# Patient Record
Sex: FEMALE | Race: WHITE | URBAN - METROPOLITAN AREA
[De-identification: names, ages, dates, MRNs, and addresses within clinical notes are randomized per-mention and may not be internally consistent; named-entity substitution may affect disease eponyms.]

---

## 2018-01-16 ENCOUNTER — OFFICE VISIT (OUTPATIENT)
Dept: FAMILY MEDICINE | Facility: CLINIC | Age: 78
End: 2018-01-16
Payer: MEDICARE

## 2018-01-16 VITALS
HEIGHT: 67 IN | BODY MASS INDEX: 28.56 KG/M2 | TEMPERATURE: 98.4 F | WEIGHT: 182 LBS | HEART RATE: 78 BPM | SYSTOLIC BLOOD PRESSURE: 142 MMHG | DIASTOLIC BLOOD PRESSURE: 76 MMHG

## 2018-01-16 DIAGNOSIS — L24.9 IRRITANT CONTACT DERMATITIS, UNSPECIFIED TRIGGER: Primary | ICD-10-CM

## 2018-01-16 PROBLEM — I10 BENIGN ESSENTIAL HYPERTENSION: Status: ACTIVE | Noted: 2018-01-16

## 2018-01-16 PROBLEM — E11.9 TYPE 2 DIABETES MELLITUS WITHOUT COMPLICATION, WITHOUT LONG-TERM CURRENT USE OF INSULIN (H): Status: ACTIVE | Noted: 2018-01-16

## 2018-01-16 PROBLEM — E03.8 OTHER SPECIFIED HYPOTHYROIDISM: Status: ACTIVE | Noted: 2018-01-16

## 2018-01-16 PROCEDURE — 99203 OFFICE O/P NEW LOW 30 MIN: CPT | Performed by: PHYSICIAN ASSISTANT

## 2018-01-16 RX ORDER — TRIAMCINOLONE ACETONIDE 5 MG/G
CREAM TOPICAL
Qty: 30 G | Refills: 0 | Status: SHIPPED | OUTPATIENT
Start: 2018-01-16 | End: 2018-01-19

## 2018-01-16 RX ORDER — LEVOTHYROXINE SODIUM 50 MCG
TABLET ORAL
COMMUNITY
Start: 2018-01-04 | End: 2018-08-14

## 2018-01-16 RX ORDER — SIMVASTATIN 20 MG
TABLET ORAL
COMMUNITY
Start: 2017-01-26

## 2018-01-16 RX ORDER — LANCETS 33 GAUGE
EACH MISCELLANEOUS
COMMUNITY
Start: 2017-10-22

## 2018-01-16 RX ORDER — BLOOD-GLUCOSE METER
EACH MISCELLANEOUS
COMMUNITY
Start: 2017-06-15

## 2018-01-16 RX ORDER — GLIMEPIRIDE 4 MG/1
TABLET ORAL
COMMUNITY
Start: 2017-10-18 | End: 2018-08-14

## 2018-01-16 RX ORDER — LOSARTAN POTASSIUM 50 MG/1
TABLET ORAL
COMMUNITY
Start: 2017-12-29 | End: 2018-08-14

## 2018-01-16 NOTE — PATIENT INSTRUCTIONS
Start applying the cream to the rash two times daily    Try benadryl at bedtime for itching    IF this doesn't help, call me in the next few days and I will send in some other medication
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
## 2018-01-16 NOTE — NURSING NOTE
"Chief Complaint   Patient presents with     Derm Problem       Initial /76  Pulse 78  Temp 98.4  F (36.9  C) (Tympanic)  Ht 5' 6.75\" (1.695 m)  Wt 182 lb (82.6 kg)  BMI 28.72 kg/m2 Estimated body mass index is 28.72 kg/(m^2) as calculated from the following:    Height as of this encounter: 5' 6.75\" (1.695 m).    Weight as of this encounter: 182 lb (82.6 kg).  Medication Reconciliation: complete     Zachary Clement CMA    "

## 2018-01-16 NOTE — MR AVS SNAPSHOT
"              After Visit Summary   1/16/2018    Gracie Jackson    MRN: [id number]           Patient Information     Date Of Birth          1940        Visit Information        Provider Department      1/16/2018 10:40 AM Theodora Johnson PA-C [facility]        Today's Diagnoses     Irritant contact dermatitis, unspecified trigger    -  1      Care Instructions    Start applying the cream to the rash two times daily    Try benadryl at bedtime for itching    IF this doesn't help, call me in the next few days and I will send in some other medication          Follow-ups after your visit        Who to contact     Normal or non-critical lab and imaging results will be communicated to you by [employer], letter or phone within 4 business days after the clinic has received the results. If you do not hear from us within 7 days, please contact the clinic through [employer] or phone. If you have a critical or abnormal lab result, we will notify you by phone as soon as possible.  Submit refill requests through [employer] or call your pharmacy and they will forward the refill request to us. Please allow 3 business days for your refill to be completed.          If you need to speak with a  for additional information , please call: [phone]             Additional Information About Your Visit        My[employer] Information     [employer]ODO lets you send messages to your doctor, view your test results, renew your prescriptions, schedule appointments and more. To sign up, go to [email or web address] . Click on \"Log in\" on the left side of the screen, which will take you to the Welcome page. Then click on \"Sign up Now\" on the right side of the page.     You will be asked to enter the access code listed below, as well as some personal information. Please follow the directions to create your username and password.     Your access code is: [email or web address]  Expires: 4/16/2018 11:19 AM     Your access code " "will  in 90 days. If you need help or a new code, please call your [place] clinic or [phone].        Care EveryWhere ID     This is your Care EveryWhere ID. This could be used by other organizations to access your [place] medical records  [id number]        Your Vitals Were     Pulse Temperature Height BMI (Body Mass Index)          78 98.4  F (36.9  C) (Tympanic) 5' 6.75\" (1.695 m) 28.72 kg/m2         Blood Pressure from Last 3 Encounters:   18 142/76    Weight from Last 3 Encounters:   18 182 lb (82.6 kg)              Today, you had the following     No orders found for display         Today's Medication Changes          These changes are accurate as of: 18 11:19 AM.  If you have any questions, ask your nurse or doctor.               Start taking these medicines.        Dose/Directions    triamcinolone 0.5 % cream   Commonly known as:  KENALOG   Used for:  Irritant contact dermatitis, unspecified trigger   Started by:  Theodora Johnson PA-C        Apply sparingly to affected area two times daily.   Quantity:  30 g   Refills:  0            Where to get your medicines      These medications were sent to [facility] [place] - [address] [facility]  [address] [place] [address]     Phone:  [phone]     triamcinolone 0.5 % cream                Primary Care Provider Fax #    Josue Yu [phone]       [place] [address]        Equal Access to Services     [facility] AH: Hadii aad ku hadasho Soomaali, waaxda luqadaha, qaybta kaalmada adeegyada, waxay hugo monte . So [facility] [phone].    ATENCIÓN: Si habla dylan, tiene a nicholson disposición servicios gratuitos de asistencia lingüística. Llame al [phone].    We comply with applicable federal civil rights laws and [place] laws. We do not discriminate on the basis of race, color, national origin, age, disability, sex, sexual orientation, or gender " identity.            Thank you!     Thank you for choosing [facility]  for your care. Our goal is always to provide you with excellent care. Hearing back from our patients is one way we can continue to improve our services. Please take a few minutes to complete the written survey that you may receive in the mail after your visit with us. Thank you!             Your Updated Medication List - Protect others around you: Learn how to safely use, store and throw away your medicines at www.disposemymeds.org.          This list is accurate as of: 1/16/18 11:19 AM.  Always use your most recent med list.                   Brand Name Dispense Instructions for use Diagnosis    blood glucose monitoring meter device kit           glimepiride 4 MG tablet    AMARYL          losartan 50 MG tablet    COZAAR          * metFORMIN 1000 MG tablet    GLUCOPHAGE          * metFORMIN 500 MG tablet    GLUCOPHAGE          ONETOUCH DELICA LANCETS 33G Misc           ONETOUCH ULTRA test strip   Generic drug:  blood glucose monitoring           simvastatin 20 MG tablet    ZOCOR          SYNTHROID 50 MCG tablet   Generic drug:  levothyroxine           triamcinolone 0.5 % cream    KENALOG    30 g    Apply sparingly to affected area two times daily.    Irritant contact dermatitis, unspecified trigger       * Notice:  This list has 2 medication(s) that are the same as other medications prescribed for you. Read the directions carefully, and ask your doctor or other care provider to review them with you.

## 2018-01-19 ENCOUNTER — TELEPHONE (OUTPATIENT)
Dept: FAMILY MEDICINE | Facility: CLINIC | Age: 78
End: 2018-01-19

## 2018-01-19 DIAGNOSIS — L24.9 IRRITANT CONTACT DERMATITIS, UNSPECIFIED TRIGGER: ICD-10-CM

## 2018-01-19 RX ORDER — TRIAMCINOLONE ACETONIDE 5 MG/G
CREAM TOPICAL
Qty: 30 G | Refills: 0 | Status: SHIPPED | OUTPATIENT
Start: 2018-01-19

## 2018-01-19 NOTE — TELEPHONE ENCOUNTER
Patient was seen on 1/16/18 and was prescribed triamcinolone cream for a rash on her hands and arms. She reports that the rash is getting better, but it is still there along with the itch. She does not want to run out of the cream over the weekend. She is asking for a refill and if you think an oral antibiotic would be better, she is open to that as well. Refill is pended. Please advise. Thank you.  Marcella Mary RN

## 2018-01-19 NOTE — TELEPHONE ENCOUNTER
Reason for call:  Patient reporting a symptom    Symptom or request: Gracie is reporting that rash is improving but she is running out of the cream.  It was mentioned at appointment on 1/16/18 that maybe if cream did not work that Theodora could prescribe an oral medication.  Gracie doesn't know if that would be better or just getting another tube of the cream.  Please call and assess. Thank you..Cate Moe    Duration (how long have symptoms been present): Since 1/16/18    Have you been treated for this before? Yes with triamcinolone cream    Additional comments: none    Phone Number patient can be reached at:  Home number on file [phone] (home)    Best Time:  Any time    Can we leave a detailed message on this number:  YES    Call taken on 1/19/2018 at 10:41 AM by Cate Moe

## 2018-01-19 NOTE — TELEPHONE ENCOUNTER
I sent in the refill - did she really go through the tube she had filled on 1/16? Make sure she is not using it more than two times/day.     An antibiotic will not change anything but we had discussed a short burst of prednisone which could help. She could try the steroid ointment through the weekend and if still present on Monday call back and I can order the prednisone    Theodora

## 2018-06-12 ENCOUNTER — TELEPHONE (OUTPATIENT)
Dept: FAMILY MEDICINE | Facility: CLINIC | Age: 78
End: 2018-06-12

## 2018-06-12 NOTE — TELEPHONE ENCOUNTER
Gracie calling clinic because she was walking across her living room and stepped on a sewing needle she was using to repair some upholstery.She said it wasn't all the way in her foot but hurt non the less. Her doctor is in [place] but she is up here for the summer trying to sell her home.     She washed her foot well with hydrogen peroxide. She is wondering if she should be seen because she is diabetic.    Recommended soaking foot and applying bacitracin to puncture site. Should have a tetanus shot every ten years. If not up to date would get that however it is unlikely she would get tetanus from a sewing needle.But she could update this by coming to one of our pharmacies.    Watch for redness, fever or drainage. FYI to former provider. Janelle Acuña RN

## 2018-08-14 ENCOUNTER — OFFICE VISIT (OUTPATIENT)
Dept: FAMILY MEDICINE | Facility: CLINIC | Age: 78
End: 2018-08-14
Payer: MEDICARE

## 2018-08-14 VITALS
HEART RATE: 74 BPM | TEMPERATURE: 98 F | SYSTOLIC BLOOD PRESSURE: 144 MMHG | DIASTOLIC BLOOD PRESSURE: 76 MMHG | BODY MASS INDEX: 28.88 KG/M2 | HEIGHT: 67 IN | WEIGHT: 184 LBS

## 2018-08-14 DIAGNOSIS — I10 BENIGN ESSENTIAL HYPERTENSION: ICD-10-CM

## 2018-08-14 DIAGNOSIS — E11.9 TYPE 2 DIABETES MELLITUS WITHOUT COMPLICATION, WITHOUT LONG-TERM CURRENT USE OF INSULIN (H): ICD-10-CM

## 2018-08-14 DIAGNOSIS — Z00.00 ENCOUNTER FOR ROUTINE ADULT HEALTH EXAMINATION WITHOUT ABNORMAL FINDINGS: Primary | ICD-10-CM

## 2018-08-14 DIAGNOSIS — E03.9 HYPOTHYROIDISM, UNSPECIFIED TYPE: ICD-10-CM

## 2018-08-14 LAB
ANION GAP SERPL CALCULATED.3IONS-SCNC: 6 MMOL/L (ref 3–14)
BUN SERPL-MCNC: 17 MG/DL (ref 7–30)
CALCIUM SERPL-MCNC: 9.2 MG/DL (ref 8.5–10.1)
CHLORIDE SERPL-SCNC: 105 MMOL/L (ref 94–109)
CO2 SERPL-SCNC: 29 MMOL/L (ref 20–32)
CREAT SERPL-MCNC: 0.76 MG/DL (ref 0.52–1.04)
GFR SERPL CREATININE-BSD FRML MDRD: 73 ML/MIN/1.7M2
GLUCOSE SERPL-MCNC: 104 MG/DL (ref 70–99)
HBA1C MFR BLD: 6.4 % (ref 0–5.6)
POTASSIUM SERPL-SCNC: 3.8 MMOL/L (ref 3.4–5.3)
SODIUM SERPL-SCNC: 140 MMOL/L (ref 133–144)
TSH SERPL DL<=0.005 MIU/L-ACNC: 3.54 MU/L (ref 0.4–4)

## 2018-08-14 PROCEDURE — 99213 OFFICE O/P EST LOW 20 MIN: CPT | Mod: 25 | Performed by: PHYSICIAN ASSISTANT

## 2018-08-14 PROCEDURE — 80048 BASIC METABOLIC PNL TOTAL CA: CPT | Performed by: PHYSICIAN ASSISTANT

## 2018-08-14 PROCEDURE — G0439 PPPS, SUBSEQ VISIT: HCPCS | Performed by: PHYSICIAN ASSISTANT

## 2018-08-14 PROCEDURE — 84443 ASSAY THYROID STIM HORMONE: CPT | Performed by: PHYSICIAN ASSISTANT

## 2018-08-14 PROCEDURE — 83036 HEMOGLOBIN GLYCOSYLATED A1C: CPT | Performed by: PHYSICIAN ASSISTANT

## 2018-08-14 PROCEDURE — 36415 COLL VENOUS BLD VENIPUNCTURE: CPT | Performed by: PHYSICIAN ASSISTANT

## 2018-08-14 RX ORDER — ANTIARTHRITIC COMBINATION NO.2 900 MG
TABLET ORAL
COMMUNITY

## 2018-08-14 RX ORDER — LOSARTAN POTASSIUM 50 MG/1
50 TABLET ORAL DAILY
Qty: 90 TABLET | Refills: 1 | Status: SHIPPED | OUTPATIENT
Start: 2018-08-14 | End: 2018-12-28

## 2018-08-14 RX ORDER — GLIMEPIRIDE 4 MG/1
4 TABLET ORAL
Qty: 90 TABLET | Refills: 1 | Status: SHIPPED | OUTPATIENT
Start: 2018-08-14 | End: 2018-12-28

## 2018-08-14 RX ORDER — LEVOTHYROXINE SODIUM 50 MCG
50 TABLET ORAL DAILY
Qty: 90 TABLET | Refills: 1 | COMMUNITY
Start: 2018-08-14 | End: 2018-12-28
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
## 2018-08-14 NOTE — LETTER
August 17, 2018      Gracie Jackson  [address]  [place] [address]        Dear Gracie,    We are writing to inform you of your test results.    Gracie,   Your labs all look okay   Your electrolytes and kidney function are normal   Thyroid level is within the normal range   Your A1c is 6.4. While I don't know how this compares to your previous levels (I will when I get your records from [place]), this is a good level to be at   Theodora     Resulted Orders   TSH with free T4 reflex   Result Value Ref Range    TSH 3.54 0.40 - 4.00 mU/L   Hemoglobin A1c   Result Value Ref Range    Hemoglobin A1C 6.4 (H) 0 - 5.6 %      Comment:      Normal <5.7% Prediabetes 5.7-6.4%  Diabetes 6.5% or higher - adopted from ADA   consensus guidelines.     Basic metabolic panel  (Ca, Cl, CO2, Creat, Gluc, K, Na, BUN)   Result Value Ref Range    Sodium 140 133 - 144 mmol/L    Potassium 3.8 3.4 - 5.3 mmol/L    Chloride 105 94 - 109 mmol/L    Carbon Dioxide 29 20 - 32 mmol/L    Anion Gap 6 3 - 14 mmol/L    Glucose 104 (H) 70 - 99 mg/dL    Urea Nitrogen 17 7 - 30 mg/dL    Creatinine 0.76 0.52 - 1.04 mg/dL    GFR Estimate 73 >60 mL/min/1.7m2      Comment:      Non  GFR Calc    GFR Estimate If Black 88 >60 mL/min/1.7m2      Comment:       GFR Calc    Calcium 9.2 8.5 - 10.1 mg/dL       If you have any questions or concerns, please call the clinic at the number listed above.       Sincerely,        Theodora Johnson PA-C

## 2018-08-14 NOTE — MR AVS SNAPSHOT
After Visit Summary   8/14/2018    Gracie Jackson    MRN: [id number]           Patient Information     Date Of Birth          1940        Visit Information        Provider Department      8/14/2018 1:20 PM Theodora Johnson PA-C [facility]        Today's Diagnoses     Type 2 diabetes mellitus without complication, without long-term current use of insulin (H)    -  1    Benign essential hypertension        Hypothyroidism, unspecified type          Care Instructions      Preventive Health Recommendations    Female Ages 65 +    Yearly exam:     See your health care provider every year in order to  o Review health changes.   o Discuss preventive care.    o Review your medicines if your doctor has prescribed any.      You no longer need a yearly Pap test unless you've had an abnormal Pap test in the past 10 years. If you have vaginal symptoms, such as bleeding or discharge, be sure to talk with your provider about a Pap test.      Every 1 to 2 years, have a mammogram.  If you are over 69, talk with your health care provider about whether or not you want to continue having screening mammograms.      Every 10 years, have a colonoscopy. Or, have a yearly FIT test (stool test). These exams will check for colon cancer.       Have a cholesterol test every 5 years, or more often if your doctor advises it.       Have a diabetes test (fasting glucose) every three years. If you are at risk for diabetes, you should have this test more often.       At age 65, have a bone density scan (DEXA) to check for osteoporosis (brittle bone disease).    Shots:    Get a flu shot each year.    Get a tetanus shot every 10 years.    Talk to your doctor about your pneumonia vaccines. There are now two you should receive - Pneumovax (PPSV 23) and Prevnar (PCV 13).    Talk to your pharmacist about the shingles vaccine.    Talk to your doctor about the hepatitis B vaccine.    Nutrition:     Eat at least 5  "servings of fruits and vegetables each day.      Eat whole-grain bread, whole-wheat pasta and brown rice instead of white grains and rice.      Get adequate Calcium and Vitamin D.     Lifestyle    Exercise at least 150 minutes a week (30 minutes a day, 5 days a week). This will help you control your weight and prevent disease.      Limit alcohol to one drink per day.      No smoking.       Wear sunscreen to prevent skin cancer.       See your dentist twice a year for an exam and cleaning.      See your eye doctor every 1 to 2 years to screen for conditions such as glaucoma, macular degeneration and cataracts.          Follow-ups after your visit        Who to contact     Normal or non-critical lab and imaging results will be communicated to you by CytoLogichart, letter or phone within 4 business days after the clinic has received the results. If you do not hear from us within 7 days, please contact the clinic through Grouplyt or phone. If you have a critical or abnormal lab result, we will notify you by phone as soon as possible.  Submit refill requests through wufoo or call your pharmacy and they will forward the refill request to us. Please allow 3 business days for your refill to be completed.          If you need to speak with a  for additional information , please call: [phone]             Additional Information About Your Visit        wufoo Information     wufoo lets you send messages to your doctor, view your test results, renew your prescriptions, schedule appointments and more. To sign up, go to [email or web address] . Click on \"Log in\" on the left side of the screen, which will take you to the Welcome page. Then click on \"Sign up Now\" on the right side of the page.     You will be asked to enter the access code listed below, as well as some personal information. Please follow the directions to create your username and password.     Your access code is: [email or web address]  Expires: " "2018 11:44 AM     Your access code will  in 90 days. If you need help or a new code, please call your [facility] clinic or [phone].        Care EveryWhere ID     This is your Care EveryWhere ID. This could be used by other organizations to access your [facility] medical records  [id number]        Your Vitals Were     Pulse Temperature Height BMI (Body Mass Index)          74 98  F (36.7  C) 5' 6.75\" (1.695 m) 29.03 kg/m2         Blood Pressure from Last 3 Encounters:   18 144/76   18 142/76    Weight from Last 3 Encounters:   18 184 lb (83.5 kg)   18 182 lb (82.6 kg)              We Performed the Following     Basic metabolic panel  (Ca, Cl, CO2, Creat, Gluc, K, Na, BUN)     Hemoglobin A1c     TSH with free T4 reflex          Today's Medication Changes          These changes are accurate as of 18  2:26 PM.  If you have any questions, ask your nurse or doctor.               These medicines have changed or have updated prescriptions.        Dose/Directions    glimepiride 4 MG tablet   Commonly known as:  AMARYL   This may have changed:    - how much to take  - how to take this  - when to take this   Used for:  Type 2 diabetes mellitus without complication, without long-term current use of insulin (H)   Changed by:  Theodora Johnson PA-C        Dose:  4 mg   Take 1 tablet (4 mg) by mouth every morning (before breakfast)   Quantity:  90 tablet   Refills:  1       losartan 50 MG tablet   Commonly known as:  COZAAR   This may have changed:    - how much to take  - how to take this  - when to take this   Used for:  Benign essential hypertension   Changed by:  Theodora Johnson PA-C        Dose:  50 mg   Take 1 tablet (50 mg) by mouth daily   Quantity:  90 tablet   Refills:  1       metFORMIN 500 MG tablet   Commonly known as:  GLUCOPHAGE   This may have changed:    - how much to take  - how to take this  - when to take this  - Another medication with the same " name was removed. Continue taking this medication, and follow the directions you see here.   Used for:  Type 2 diabetes mellitus without complication, without long-term current use of insulin (H)   Changed by:  Theodora Johnson PA-C        Dose:  1000 mg   Take 2 tablets (1,000 mg) by mouth 2 times daily (with meals)   Quantity:  60 tablet   Refills:  0       SYNTHROID 50 MCG tablet   This may have changed:    - how much to take  - how to take this  - when to take this   Used for:  Hypothyroidism, unspecified type   Generic drug:  levothyroxine   Changed by:  Theodora Johnson PA-C        Dose:  50 mcg   Take 1 tablet (50 mcg) by mouth daily   Quantity:  90 tablet   Refills:  1            Where to get your medicines      These medications were sent to [email or web address] - [address] [address]     Phone:  [phone]     glimepiride 4 MG tablet    losartan 50 MG tablet                Primary Care Provider Fax #    Josue Yu [phone]       [place]  [place] [address]        Equal Access to Services     [facility]: Hadii jesus bolivar hadasho Sonitish, waaxda luqadaha, qaybta kaalmayared ademedardo, larry monte . So [facility] [phone].    ATENCIÓN: Si habla español, tiene a nicholson disposición servicios gratuitos de asistencia lingüística. [facility] [phone].    We comply with applicable federal civil rights laws and [place] laws. We do not discriminate on the basis of race, color, national origin, age, disability, sex, sexual orientation, or gender identity.            Thank you!     Thank you for choosing [facility]  for your care. Our goal is always to provide you with excellent care. Hearing back from our patients is one way we can continue to improve our services. Please take a few minutes to complete the written survey that you may receive in the mail after your visit with us. Thank  you!             Your Updated Medication List - Protect others around you: Learn how to safely use, store and throw away your medicines at www.disposemymeds.org.          This list is accurate as of 8/14/18  2:26 PM.  Always use your most recent med list.                   Brand Name Dispense Instructions for use Diagnosis    Biotin 5000 MCG Tabs           blood glucose monitoring meter device kit           glimepiride 4 MG tablet    AMARYL    90 tablet    Take 1 tablet (4 mg) by mouth every morning (before breakfast)    Type 2 diabetes mellitus without complication, without long-term current use of insulin (H)       losartan 50 MG tablet    COZAAR    90 tablet    Take 1 tablet (50 mg) by mouth daily    Benign essential hypertension       metFORMIN 500 MG tablet    GLUCOPHAGE    60 tablet    Take 2 tablets (1,000 mg) by mouth 2 times daily (with meals)    Type 2 diabetes mellitus without complication, without long-term current use of insulin (H)       ONETOUCH DELICA LANCETS 33G Misc           ONETOUCH ULTRA test strip   Generic drug:  blood glucose monitoring           simvastatin 20 MG tablet    ZOCOR          SYNTHROID 50 MCG tablet   Generic drug:  levothyroxine     90 tablet    Take 1 tablet (50 mcg) by mouth daily    Hypothyroidism, unspecified type       triamcinolone 0.5 % cream    KENALOG    30 g    Apply sparingly to affected area two times daily.    Irritant contact dermatitis, unspecified trigger

## 2018-12-28 ENCOUNTER — OFFICE VISIT (OUTPATIENT)
Dept: FAMILY MEDICINE | Facility: CLINIC | Age: 78
End: 2018-12-28
Payer: MEDICARE

## 2018-12-28 VITALS
HEART RATE: 74 BPM | SYSTOLIC BLOOD PRESSURE: 138 MMHG | DIASTOLIC BLOOD PRESSURE: 72 MMHG | BODY MASS INDEX: 29.51 KG/M2 | WEIGHT: 188 LBS | TEMPERATURE: 98.9 F | HEIGHT: 67 IN

## 2018-12-28 DIAGNOSIS — E11.9 TYPE 2 DIABETES MELLITUS WITHOUT COMPLICATION, WITHOUT LONG-TERM CURRENT USE OF INSULIN (H): ICD-10-CM

## 2018-12-28 DIAGNOSIS — E03.9 HYPOTHYROIDISM, UNSPECIFIED TYPE: ICD-10-CM

## 2018-12-28 DIAGNOSIS — I10 BENIGN ESSENTIAL HYPERTENSION: ICD-10-CM

## 2018-12-28 LAB
CREAT UR-MCNC: 60 MG/DL
HBA1C MFR BLD: 6.8 % (ref 0–5.6)
MICROALBUMIN UR-MCNC: 6 MG/L
MICROALBUMIN/CREAT UR: 9.4 MG/G CR (ref 0–25)

## 2018-12-28 PROCEDURE — 36415 COLL VENOUS BLD VENIPUNCTURE: CPT | Performed by: PHYSICIAN ASSISTANT

## 2018-12-28 PROCEDURE — 82043 UR ALBUMIN QUANTITATIVE: CPT | Performed by: PHYSICIAN ASSISTANT

## 2018-12-28 PROCEDURE — 99207 C FOOT EXAM  NO CHARGE: CPT | Performed by: PHYSICIAN ASSISTANT

## 2018-12-28 PROCEDURE — 99214 OFFICE O/P EST MOD 30 MIN: CPT | Performed by: PHYSICIAN ASSISTANT

## 2018-12-28 PROCEDURE — 83036 HEMOGLOBIN GLYCOSYLATED A1C: CPT | Performed by: PHYSICIAN ASSISTANT

## 2018-12-28 RX ORDER — LOSARTAN POTASSIUM 50 MG/1
50 TABLET ORAL DAILY
Qty: 90 TABLET | Refills: 1 | Status: SHIPPED | OUTPATIENT
Start: 2018-12-28 | End: 2019-07-18

## 2018-12-28 RX ORDER — LEVOTHYROXINE SODIUM 50 MCG
50 TABLET ORAL DAILY
Qty: 90 TABLET | Refills: 1 | Status: SHIPPED | OUTPATIENT
Start: 2018-12-28 | End: 2019-08-29

## 2018-12-28 RX ORDER — GLIMEPIRIDE 4 MG/1
4 TABLET ORAL
Qty: 90 TABLET | Refills: 1 | Status: SHIPPED | OUTPATIENT
Start: 2018-12-28 | End: 2019-07-18

## 2018-12-28 ASSESSMENT — PAIN SCALES - GENERAL: PAINLEVEL: NO PAIN (0)

## 2018-12-28 ASSESSMENT — MIFFLIN-ST. JEOR: SCORE: 1356.42

## 2018-12-28 NOTE — LETTER
January 8, 2019      Gracie Jackson  [id number]  [place] [address]        Dear Gracie,     We are writing to inform you of your test results.    Your labs look okay. We will plan on follow up in 6 months as discussed unless you need to be seen sooner.    If you have any questions or concerns, please call the clinic at the number listed above.     Sincerely,    Theodora Johnson PA-C/sc      Resulted Orders   Hemoglobin A1c   Result Value Ref Range    Hemoglobin A1C 6.8 (H) 0 - 5.6 %      Comment:      Normal <5.7% Prediabetes 5.7-6.4%  Diabetes 6.5% or higher - adopted from ADA   consensus guidelines.     Albumin Random Urine Quantitative with Creat Ratio   Result Value Ref Range    Creatinine Urine 60 mg/dL    Albumin Urine mg/L 6 mg/L    Albumin Urine mg/g Cr 9.40 0 - 25 mg/g Cr

## 2018-12-28 NOTE — PROGRESS NOTES
SUBJECTIVE:   Gracie Jackson is a 78 year old female who presents to clinic today for the following health issues:      Medication Followup of Metformin 500 mg     Taking Medication as prescribed: yes    Side Effects:  None    Medication Helping Symptoms:  yes     HEre for follow up - was told she needed to be seen when requesting refills  Still have not received her past records from [place] - patient not sure what the name of [facility] clinic was that she went to so unable to locate  She has been feeling okay - daughter has moved out or is in the process of moving out so she feels like her stress level has gone down  Still trying to clean up the house so as to hopefully sell it  Denies any concerns with current medications  Discussed borderline high blood pressure - she does not want to make changes to her dose or medications though as she was on a 2nd medication in the past and felt like she got really lightheaded      Problem list and histories reviewed & adjusted, as indicated.  Additional history: as documented    Current Outpatient Medications   Medication Sig Dispense Refill     Biotin 5000 MCG TABS        blood glucose monitoring (ONE TOUCH ULTRA 2) meter device kit        glimepiride (AMARYL) 4 MG tablet Take 1 tablet (4 mg) by mouth every morning (before breakfast) 90 tablet 1     losartan (COZAAR) 50 MG tablet Take 1 tablet (50 mg) by mouth daily 90 tablet 1     metFORMIN (GLUCOPHAGE) 500 MG tablet Take 2 tablets (1,000 mg) by mouth 2 times daily (with meals) 360 tablet 1     ONETOUCH DELICA LANCETS 33G MISC        ONETOUCH ULTRA test strip        simvastatin (ZOCOR) 20 MG tablet        SYNTHROID 50 MCG tablet Take 1 tablet (50 mcg) by mouth daily 90 tablet 1     triamcinolone (KENALOG) 0.5 % cream Apply sparingly to affected area two times daily. 30 g 0     Not on File  BP Readings from Last 3 Encounters:   12/28/18 138/72   08/14/18 144/76   01/16/18 142/76    Wt Readings from Last 3 Encounters:  "  12/28/18 85.3 kg (188 lb)   08/14/18 83.5 kg (184 lb)   01/16/18 82.6 kg (182 lb)                    Reviewed and updated as needed this visit by clinical staff  Tobacco  Allergies  Meds  Problems  Med Hx  Surg Hx  Fam Hx  Soc Hx        Reviewed and updated as needed this visit by Provider  Tobacco  Allergies  Meds  Problems  Med Hx  Surg Hx  Fam Hx         ROS:  Remainder of ROS obtained and found to be negative other than that which was documented above      OBJECTIVE:     /72   Pulse 74   Temp 98.9  F (37.2  C) (Tympanic)   Ht 1.695 m (5' 6.75\")   Wt 85.3 kg (188 lb)   BMI 29.67 kg/m    Body mass index is 29.67 kg/m .  GENERAL: healthy, alert and no distress  RESP: lungs clear to auscultation - no rales, rhonchi or wheezes  CV: regular rates and rhythm, normal S1 S2, no S3 or S4, no murmur, click or rub and no peripheral edema  MS: no gross musculoskeletal defects noted, no edema  Diabetic foot exam: normal DP and PT pulses, no trophic changes or ulcerative lesions and normal monofilament exam, except for findings noted on diabetic foot graphical image.            Decreased sensation at 4, 5 bilaterally      Diagnostic Test Results:  Labs pending    ASSESSMENT/PLAN:     (E11.9) Type 2 diabetes mellitus without complication, without long-term current use of insulin (H)  Comment: continue medications - refilled today. Recheck in 6 months  Plan: metFORMIN (GLUCOPHAGE) 500 MG tablet,         glimepiride (AMARYL) 4 MG tablet, FOOT EXAM,         Hemoglobin A1c, Albumin Random Urine         Quantitative with Creat Ratio            (I10) Benign essential hypertension  Comment: repeat blood pressure improved. Continue medications. No increase in dose today  Plan: losartan (COZAAR) 50 MG tablet            (E03.9) Hypothyroidism, unspecified type  Comment: refilled  Plan: SYNTHROID 50 MCG tablet                Theodora Johnson PA-C  [facility]    "

## 2019-06-09 DIAGNOSIS — E11.9 TYPE 2 DIABETES MELLITUS WITHOUT COMPLICATION, WITHOUT LONG-TERM CURRENT USE OF INSULIN (H): ICD-10-CM

## 2019-06-10 NOTE — TELEPHONE ENCOUNTER
Prescription approved per [facility] Refill Protocol.  Sharon Barrett RN

## 2019-06-10 NOTE — TELEPHONE ENCOUNTER
"METFORMIN HCL TABS 500MG      Last Written Prescription Date:  12/28/18  Last Fill Quantity: 360,   # refills: 1  Last Office Visit: 12/28/18  Future Office visit:       Requested Prescriptions   Pending Prescriptions Disp Refills     metFORMIN (GLUCOPHAGE) 500 MG tablet [Pharmacy Med Name: METFORMIN HCL TABS 500MG] 360 tablet 1     Sig: TAKE 2 TABLETS TWICE A DAY WITH MEALS       Biguanide Agents Failed - 6/9/2019  6:32 AM        Failed - Patient has documented LDL within the past 12 mos.     No lab results found.          Passed - Blood pressure less than 140/90 in past 6 months     BP Readings from Last 3 Encounters:   12/28/18 138/72   08/14/18 144/76   01/16/18 142/76                 Passed - Patient has had a Microalbumin in the past 15 mos.     Recent Labs   Lab Test 12/28/18  1543   MICROL 6   UMALCR 9.40             Passed - Patient is age 10 or older        Passed - Patient has documented A1c within the specified period of time.     If HgbA1C is 8 or greater, it needs to be on file within the past 3 months.  If less than 8, must be on file within the past 6 months.     Recent Labs   Lab Test 12/28/18  1523   A1C 6.8*             Passed - Patient's CR is NOT>1.4 OR Patient's EGFR is NOT<45 within past 12 mos.     Recent Labs   Lab Test 08/14/18  1432   GFRESTIMATED 73   GFRESTBLACK 88       Recent Labs   Lab Test 08/14/18  1432   CR 0.76             Passed - Patient does NOT have a diagnosis of CHF.        Passed - Medication is active on med list        Passed - Patient is not pregnant        Passed - Patient has not had a positive pregnancy test within the past 12 mos.         Passed - Recent (6 mo) or future (30 days) visit within the authorizing provider's specialty     Patient had office visit in the last 6 months or has a visit in the next 30 days with authorizing provider or within the authorizing provider's specialty.  See \"Patient Info\" tab in inbasket, or \"Choose Columns\" in Meds & Orders section " of the refill encounter.

## 2019-07-18 DIAGNOSIS — I10 BENIGN ESSENTIAL HYPERTENSION: ICD-10-CM

## 2019-07-18 DIAGNOSIS — E11.9 TYPE 2 DIABETES MELLITUS WITHOUT COMPLICATION, WITHOUT LONG-TERM CURRENT USE OF INSULIN (H): ICD-10-CM

## 2019-07-19 RX ORDER — LOSARTAN POTASSIUM 50 MG/1
TABLET ORAL
Qty: 90 TABLET | Refills: 1 | Status: SHIPPED | OUTPATIENT
Start: 2019-07-19 | End: 2019-08-29

## 2019-07-19 RX ORDER — GLIMEPIRIDE 4 MG/1
TABLET ORAL
Qty: 90 TABLET | Refills: 1 | Status: SHIPPED | OUTPATIENT
Start: 2019-07-19 | End: 2019-08-29

## 2019-07-19 NOTE — TELEPHONE ENCOUNTER
"Requested Prescriptions   Pending Prescriptions Disp Refills     glimepiride (AMARYL) 4 MG tablet [Pharmacy Med Name: GLIMEPIRIDE TABS 4MG]  Last Written Prescription Date:  12/28/18  Last Fill Quantity: 90,  # refills: 1   Last office visit: 12/28/2018 with prescribing provider:  babatunde   Future Office Visit:     90 tablet 1     Sig: TAKE 1 TABLET EVERY MORNING BEFORE BREAKFAST       Sulfonylurea Agents Failed - 7/18/2019 11:26 PM        Failed - Blood pressure less than 140/90 in past 6 months     BP Readings from Last 3 Encounters:   12/28/18 138/72   08/14/18 144/76   01/16/18 142/76                 Failed - Patient has documented LDL within the past 12 mos.     No lab results found.          Failed - Patient has documented A1c within the specified period of time.     If HgbA1C is 8 or greater, it needs to be on file within the past 3 months.  If less than 8, must be on file within the past 6 months.     Recent Labs   Lab Test 12/28/18  1523   A1C 6.8*             Failed - Recent (6 mo) or future (30 days) visit within the authorizing provider's specialty     Patient had office visit in the last 6 months or has a visit in the next 30 days with authorizing provider or within the authorizing provider's specialty.  See \"Patient Info\" tab in inbasket, or \"Choose Columns\" in Meds & Orders section of the refill encounter.            Passed - Patient has had a Microalbumin in the past 15 mos.     Recent Labs   Lab Test 12/28/18  1543   MICROL 6   UMALCR 9.40             Passed - Medication is active on med list        Passed - Patient is age 18 or older        Passed - No active pregnancy on record        Passed - Patient has a recent creatinine (normal) within the past 12 mos.     Recent Labs   Lab Test 08/14/18  1432   CR 0.76             Passed - Patient has not had a positive pregnancy test within the past 12 mos.        losartan (COZAAR) 50 MG tablet [Pharmacy Med Name: LOSARTAN TABS 50MG]  Last Written " "Prescription Date:  12/28/18  Last Fill Quantity: 90,  # refills: 1   Last office visit: 12/28/2018 with prescribing provider:  ruthann   Future Office Visit:     90 tablet 1     Sig: TAKE 1 TABLET DAILY       Angiotensin-II Receptors Passed - 7/18/2019 11:26 PM        Passed - Blood pressure under 140/90 in past 12 months     BP Readings from Last 3 Encounters:   12/28/18 138/72   08/14/18 144/76   01/16/18 142/76                 Passed - Recent (12 mo) or future (30 days) visit within the authorizing provider's specialty     Patient had office visit in the last 12 months or has a visit in the next 30 days with authorizing provider or within the authorizing provider's specialty.  See \"Patient Info\" tab in inbasket, or \"Choose Columns\" in Meds & Orders section of the refill encounter.              Passed - Medication is active on med list        Passed - Patient is age 18 or older        Passed - No active pregnancy on record        Passed - Normal serum creatinine on file in past 12 months     Recent Labs   Lab Test 08/14/18  1432   CR 0.76             Passed - Normal serum potassium on file in past 12 months     Recent Labs   Lab Test 08/14/18  1432   POTASSIUM 3.8                    Passed - No positive pregnancy test in past 12 months          "

## 2019-07-19 NOTE — TELEPHONE ENCOUNTER
Prescription approved per [facility] Refill Protocol.  Pt was seen and A1C of 6.8 on 12/28/18.  Owen

## 2019-08-29 DIAGNOSIS — I10 BENIGN ESSENTIAL HYPERTENSION: ICD-10-CM

## 2019-08-29 DIAGNOSIS — E03.9 HYPOTHYROIDISM, UNSPECIFIED TYPE: ICD-10-CM

## 2019-08-29 DIAGNOSIS — E11.9 TYPE 2 DIABETES MELLITUS WITHOUT COMPLICATION, WITHOUT LONG-TERM CURRENT USE OF INSULIN (H): ICD-10-CM

## 2019-08-29 RX ORDER — LOSARTAN POTASSIUM 50 MG/1
50 TABLET ORAL DAILY
Qty: 90 TABLET | Refills: 1 | Status: SHIPPED | OUTPATIENT
Start: 2019-08-29

## 2019-08-29 RX ORDER — GLIMEPIRIDE 4 MG/1
4 TABLET ORAL
Qty: 90 TABLET | Refills: 1 | Status: SHIPPED | OUTPATIENT
Start: 2019-08-29

## 2019-08-29 RX ORDER — LEVOTHYROXINE SODIUM 50 MCG
50 TABLET ORAL DAILY
Qty: 90 TABLET | Refills: 1 | Status: SHIPPED | OUTPATIENT
Start: 2019-08-29 | End: 2019-11-19

## 2019-08-29 NOTE — TELEPHONE ENCOUNTER
Gracie has moved to [place] and in the process of the move has lost all of her scripts.  Probably threw them away.  She was hoping to get just one month of the following meds to give her time to establish with another provider there.  [facility] [place], [place]  [phone] (not found)      metformin      Last Written Prescription Date:  6/10/19  Last Fill Quantity: 360,   # refills: 1  Last Office Visit: 12/28/18  Future Office visit:       Routing refill request to provider for review/approval because:  Lost medications    losartan      Last Written Prescription Date:  7/19/19  Last Fill Quantity: 90,   # refills: 1  Last Office Visit: 12/28/18  Future Office visit:       Routing refill request to provider for review/approval because:  Lost medications    synthroid      Last Written Prescription Date:  12/28/19  Last Fill Quantity: 90,   # refills: 1  Last Office Visit: 12/28/18  Future Office visit:       Routing refill request to provider for review/approval because:  Lost medications    glimepride      Last Written Prescription Date:  7/19/19  Last Fill Quantity: 90,   # refills: 1  Last Office Visit: 12/28/18  Future Office visit:       Routing refill request to provider for review/approval because:  Lost medications

## 2019-11-18 DIAGNOSIS — E03.9 HYPOTHYROIDISM, UNSPECIFIED TYPE: ICD-10-CM

## 2019-11-18 NOTE — TELEPHONE ENCOUNTER
"SYNTHROID 50 MCG tablet      Last Written Prescription Date:  8/29/19  Last Fill Quantity: 90,   # refills: 1  Last Office Visit: 12/28/18  Future Office visit:       Requested Prescriptions   Pending Prescriptions Disp Refills     SYNTHROID 50 MCG tablet 90 tablet 1     Sig: Take 1 tablet (50 mcg) by mouth daily       Thyroid Protocol Failed - 11/18/2019  4:55 PM        Failed - Normal TSH on file in past 12 months     Recent Labs   Lab Test 08/14/18  1432   TSH 3.54              Passed - Patient is 12 years or older        Passed - Recent (12 mo) or future (30 days) visit within the authorizing provider's specialty     Patient has had an office visit with the authorizing provider or a provider within the authorizing providers department within the previous 12 mos or has a future within next 30 days. See \"Patient Info\" tab in inbasket, or \"Choose Columns\" in Meds & Orders section of the refill encounter.              Passed - Medication is active on med list        Passed - No active pregnancy on record     If patient is pregnant or has had a positive pregnancy test, please check TSH.          Passed - No positive pregnancy test in past 12 months     If patient is pregnant or has had a positive pregnancy test, please check TSH.            "

## 2019-11-19 RX ORDER — LEVOTHYROXINE SODIUM 50 MCG
50 TABLET ORAL DAILY
Qty: 90 TABLET | Refills: 0 | Status: SHIPPED | OUTPATIENT
Start: 2019-11-19

## 2019-11-20 DIAGNOSIS — E03.9 HYPOTHYROIDISM, UNSPECIFIED TYPE: ICD-10-CM

## 2019-11-20 RX ORDER — LEVOTHYROXINE SODIUM 50 MCG
50 TABLET ORAL DAILY
Qty: 90 TABLET | Refills: 0 | Status: CANCELLED | OUTPATIENT
Start: 2019-11-20

## 2019-12-06 DIAGNOSIS — E11.9 TYPE 2 DIABETES MELLITUS WITHOUT COMPLICATION, WITHOUT LONG-TERM CURRENT USE OF INSULIN (H): ICD-10-CM

## 2019-12-09 NOTE — TELEPHONE ENCOUNTER
Patient states she has moved to [place] and will find a PCP there. She still has a couple of months left of her medication so she will be just fine.    Malka Rivero RN

## 2019-12-09 NOTE — TELEPHONE ENCOUNTER
"METFORMIN HCL TABS 500MG      Last Written Prescription Date:  8/29/19  Last Fill Quantity: 360,   # refills: 1  Last Office Visit: 12/28/18  Future Office visit:       Requested Prescriptions   Pending Prescriptions Disp Refills     metFORMIN (GLUCOPHAGE) 500 MG tablet [Pharmacy Med Name: METFORMIN HCL TABS 500MG] 360 tablet 4     Sig: TAKE 2 TABLETS TWICE A DAY WITH MEALS       Biguanide Agents Failed - 12/6/2019 11:22 PM        Failed - Blood pressure less than 140/90 in past 6 months     BP Readings from Last 3 Encounters:   12/28/18 138/72   08/14/18 144/76   01/16/18 142/76                 Failed - Patient has documented LDL within the past 12 mos.     No lab results found.          Failed - Patient has documented A1c within the specified period of time.     If HgbA1C is 8 or greater, it needs to be on file within the past 3 months.  If less than 8, must be on file within the past 6 months.     Recent Labs   Lab Test 12/28/18  1523   A1C 6.8*             Failed - Patient's CR is NOT>1.4 OR Patient's EGFR is NOT<45 within past 12 mos.     Recent Labs   Lab Test 08/14/18  1432   GFRESTIMATED 73   GFRESTBLACK 88       Recent Labs   Lab Test 08/14/18  1432   CR 0.76             Failed - Recent (6 mo) or future (30 days) visit within the authorizing provider's specialty     Patient had office visit in the last 6 months or has a visit in the next 30 days with authorizing provider or within the authorizing provider's specialty.  See \"Patient Info\" tab in inbasket, or \"Choose Columns\" in Meds & Orders section of the refill encounter.            Passed - Patient has had a Microalbumin in the past 15 mos.     Recent Labs   Lab Test 12/28/18  1543   MICROL 6   UMALCR 9.40             Passed - Patient is age 10 or older        Passed - Patient does NOT have a diagnosis of CHF.        Passed - Medication is active on med list        Passed - Patient is not pregnant        Passed - Patient has not had a positive pregnancy " test within the past 12 mos.

## 2020-01-14 DIAGNOSIS — E11.9 TYPE 2 DIABETES MELLITUS WITHOUT COMPLICATION, WITHOUT LONG-TERM CURRENT USE OF INSULIN (H): ICD-10-CM

## 2020-01-14 DIAGNOSIS — I10 BENIGN ESSENTIAL HYPERTENSION: ICD-10-CM

## 2020-01-15 RX ORDER — LOSARTAN POTASSIUM 50 MG/1
TABLET ORAL
Qty: 90 TABLET | Refills: 0 | OUTPATIENT
Start: 2020-01-15

## 2020-01-15 RX ORDER — GLIMEPIRIDE 4 MG/1
TABLET ORAL
Qty: 90 TABLET | Refills: 0 | OUTPATIENT
Start: 2020-01-15

## 2020-01-15 NOTE — TELEPHONE ENCOUNTER
"GLIMEPIRIDE TABS 4MG      Last Written Prescription Date:  8/29/19  Last Fill Quantity: 90,   # refills: 1  Last Office Visit: 12/28/18  Future Office visit:       losartan (COZAAR) 50 MG tablet      Last Written Prescription Date:  8/29/19  Last Fill Quantity: 90,   # refills: 1  Last Office Visit: 12/28/18  Future Office visit:       Requested Prescriptions   Pending Prescriptions Disp Refills     glimepiride (AMARYL) 4 MG tablet [Pharmacy Med Name: GLIMEPIRIDE TABS 4MG] 90 tablet 4     Sig: TAKE 1 TABLET EVERY MORNING BEFORE BREAKFAST       Sulfonylurea Agents Failed - 1/14/2020 11:28 PM        Failed - Blood pressure less than 140/90 in past 6 months     BP Readings from Last 3 Encounters:   12/28/18 138/72   08/14/18 144/76   01/16/18 142/76                 Failed - Patient has documented LDL within the past 12 mos.     No lab results found.          Failed - Patient has documented A1c within the specified period of time.     If HgbA1C is 8 or greater, it needs to be on file within the past 3 months.  If less than 8, must be on file within the past 6 months.     Recent Labs   Lab Test 12/28/18  1523   A1C 6.8*             Failed - Patient has a recent creatinine (normal) within the past 12 mos.     Recent Labs   Lab Test 08/14/18  1432   CR 0.76             Failed - Recent (6 mo) or future (30 days) visit within the authorizing provider's specialty     Patient had office visit in the last 6 months or has a visit in the next 30 days with authorizing provider or within the authorizing provider's specialty.  See \"Patient Info\" tab in inbasket, or \"Choose Columns\" in Meds & Orders section of the refill encounter.            Passed - Patient has had a Microalbumin in the past 15 mos.     Recent Labs   Lab Test 12/28/18  1543   MICROL 6   UMALCR 9.40             Passed - Medication is active on med list        Passed - Patient is age 18 or older        Passed - No active pregnancy on record        Passed - Patient " "has not had a positive pregnancy test within the past 12 mos.        losartan (COZAAR) 50 MG tablet [Pharmacy Med Name: LOSARTAN TABS 50MG] 90 tablet 4     Sig: TAKE 1 TABLET DAILY       Angiotensin-II Receptors Failed - 1/14/2020 11:28 PM        Failed - Last blood pressure under 140/90 in past 12 months     BP Readings from Last 3 Encounters:   12/28/18 138/72   08/14/18 144/76   01/16/18 142/76                 Failed - Recent (12 mo) or future (30 days) visit within the authorizing provider's specialty     Patient has had an office visit with the authorizing provider or a provider within the authorizing providers department within the previous 12 mos or has a future within next 30 days. See \"Patient Info\" tab in inbasket, or \"Choose Columns\" in Meds & Orders section of the refill encounter.              Failed - Normal serum creatinine on file in past 12 months     Recent Labs   Lab Test 08/14/18  1432   CR 0.76             Failed - Normal serum potassium on file in past 12 months     Recent Labs   Lab Test 08/14/18  1432   POTASSIUM 3.8                    Passed - Medication is active on med list        Passed - Patient is age 18 or older        Passed - No active pregnancy on record        Passed - No positive pregnancy test in past 12 months          "